# Patient Record
Sex: MALE
[De-identification: names, ages, dates, MRNs, and addresses within clinical notes are randomized per-mention and may not be internally consistent; named-entity substitution may affect disease eponyms.]

---

## 2024-05-30 PROBLEM — Z00.00 ENCOUNTER FOR PREVENTIVE HEALTH EXAMINATION: Status: ACTIVE | Noted: 2024-05-30

## 2024-05-31 ENCOUNTER — APPOINTMENT (OUTPATIENT)
Dept: OTOLARYNGOLOGY | Facility: CLINIC | Age: 54
End: 2024-05-31
Payer: COMMERCIAL

## 2024-05-31 VITALS
HEART RATE: 82 BPM | BODY MASS INDEX: 39.63 KG/M2 | HEIGHT: 73 IN | SYSTOLIC BLOOD PRESSURE: 144 MMHG | DIASTOLIC BLOOD PRESSURE: 90 MMHG | WEIGHT: 299 LBS

## 2024-05-31 DIAGNOSIS — K11.8 OTHER DISEASES OF SALIVARY GLANDS: ICD-10-CM

## 2024-05-31 DIAGNOSIS — Z87.891 PERSONAL HISTORY OF NICOTINE DEPENDENCE: ICD-10-CM

## 2024-05-31 PROCEDURE — 99203 OFFICE O/P NEW LOW 30 MIN: CPT

## 2024-05-31 NOTE — HISTORY OF PRESENT ILLNESS
[de-identified] : 53M presenting with L neck mass. 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 He notes that it initially grew when it presented 5 years ago, but has been stable for many years. He is a prior smoker, quit 15 years ago. He denies pain/pressure or dry mouth. He has been having headaches but unclear if related. He denies facial nerve weakness or ear concerns.

## 2024-05-31 NOTE — PLAN
[TextEntry] : We will obtain imaging and FNA. I will then likely refer to Dr. Gomez.   I will call with results.

## 2024-05-31 NOTE — PHYSICAL EXAM
[Normal] : mucosa is normal [Midline] : trachea located in midline position [de-identified] : L parotid mass 8cm, firm, free from overlying skin, fixed lesion, no skin change [de-identified] : CNVII intact

## 2024-06-14 ENCOUNTER — APPOINTMENT (OUTPATIENT)
Dept: MRI IMAGING | Facility: CLINIC | Age: 54
End: 2024-06-14
Payer: COMMERCIAL

## 2024-06-14 ENCOUNTER — OUTPATIENT (OUTPATIENT)
Dept: OUTPATIENT SERVICES | Facility: HOSPITAL | Age: 54
LOS: 1 days | End: 2024-06-14
Payer: COMMERCIAL

## 2024-06-14 DIAGNOSIS — K11.8 OTHER DISEASES OF SALIVARY GLANDS: ICD-10-CM

## 2024-06-14 PROCEDURE — 70543 MRI ORBT/FAC/NCK W/O &W/DYE: CPT

## 2024-06-14 PROCEDURE — A9585: CPT

## 2024-06-14 PROCEDURE — 70543 MRI ORBT/FAC/NCK W/O &W/DYE: CPT | Mod: 26

## 2024-07-12 ENCOUNTER — APPOINTMENT (OUTPATIENT)
Dept: OTOLARYNGOLOGY | Facility: CLINIC | Age: 54
End: 2024-07-12